# Patient Record
Sex: FEMALE | Race: WHITE | Employment: UNEMPLOYED | URBAN - METROPOLITAN AREA
[De-identification: names, ages, dates, MRNs, and addresses within clinical notes are randomized per-mention and may not be internally consistent; named-entity substitution may affect disease eponyms.]

---

## 2020-01-06 PROBLEM — E87.1 HYPONATREMIA: Status: ACTIVE | Noted: 2020-01-06

## 2020-01-06 PROBLEM — E87.20 METABOLIC ACIDOSIS: Status: ACTIVE | Noted: 2020-01-06

## 2020-01-06 PROBLEM — R11.2 INTRACTABLE NAUSEA AND VOMITING: Status: ACTIVE | Noted: 2020-01-06

## 2020-01-06 PROBLEM — R55 SYNCOPE: Status: ACTIVE | Noted: 2020-01-06

## 2020-01-06 PROBLEM — E87.8 HYPOCHLOREMIA: Status: ACTIVE | Noted: 2020-01-06

## 2020-01-07 PROBLEM — F12.10 MARIJUANA ABUSE: Status: ACTIVE | Noted: 2020-01-07

## 2022-02-10 ENCOUNTER — OFFICE VISIT (OUTPATIENT)
Dept: FAMILY MEDICINE CLINIC | Age: 32
End: 2022-02-10
Payer: COMMERCIAL

## 2022-02-10 VITALS
OXYGEN SATURATION: 99 % | BODY MASS INDEX: 26.52 KG/M2 | HEART RATE: 72 BPM | WEIGHT: 145 LBS | DIASTOLIC BLOOD PRESSURE: 76 MMHG | SYSTOLIC BLOOD PRESSURE: 124 MMHG | TEMPERATURE: 98 F | RESPIRATION RATE: 14 BRPM

## 2022-02-10 DIAGNOSIS — F10.10 ALCOHOL ABUSE: ICD-10-CM

## 2022-02-10 DIAGNOSIS — F32.0 CURRENT MILD EPISODE OF MAJOR DEPRESSIVE DISORDER WITHOUT PRIOR EPISODE (HCC): Primary | ICD-10-CM

## 2022-02-10 PROCEDURE — 99203 OFFICE O/P NEW LOW 30 MIN: CPT | Performed by: FAMILY MEDICINE

## 2022-02-10 RX ORDER — SERTRALINE HYDROCHLORIDE 25 MG/1
TABLET, FILM COATED ORAL
COMMUNITY
Start: 2022-01-20 | End: 2022-10-08

## 2022-02-10 RX ORDER — SERTRALINE HYDROCHLORIDE 25 MG/1
25 TABLET, FILM COATED ORAL DAILY
Qty: 30 TABLET | Refills: 2 | Status: SHIPPED | OUTPATIENT
Start: 2022-02-10 | End: 2022-10-08

## 2022-02-10 RX ORDER — TRAZODONE HYDROCHLORIDE 50 MG/1
TABLET ORAL
COMMUNITY
Start: 2022-01-20 | End: 2022-02-10 | Stop reason: SDUPTHER

## 2022-02-10 RX ORDER — NORETHINDRONE ACETATE AND ETHINYL ESTRADIOL 1; .02 MG/1; MG/1
1 TABLET ORAL DAILY
COMMUNITY

## 2022-02-10 RX ORDER — ERGOCALCIFEROL 1.25 MG/1
CAPSULE ORAL
COMMUNITY
Start: 2022-01-20 | End: 2022-10-08

## 2022-02-10 RX ORDER — TRAZODONE HYDROCHLORIDE 50 MG/1
TABLET ORAL
Qty: 30 TABLET | Refills: 2 | Status: SHIPPED | OUTPATIENT
Start: 2022-02-10 | End: 2022-10-08

## 2022-02-10 NOTE — PROGRESS NOTES
New patient here to establish care. She will need refills sent in on her medications. 1. \"Have you been to the ER, urgent care clinic since your last visit? Hospitalized since your last visit? \" [address] 1/10/22 alcohol withdrawal    2. \"Have you seen or consulted any other health care providers outside of the [address] [place] [address] since your last visit? \" No     3. For patients aged 39-70: Has the patient had a colonoscopy / FIT/ Cologuard? NA - based on age      If the patient is female:    4. For patients aged 41-77: Has the patient had a mammogram within the past 2 years? NA - based on age or sex      11. For patients aged 21-65: Has the patient had a pap smear?  NA - based on age or sex

## 2022-02-10 NOTE — PROGRESS NOTES
Quiana Ramsey is a 27 y.o. female  presents for establish care. She has depression and low vitamin d. No Known Allergies  Outpatient Medications Marked as Taking for the 2/10/22 encounter (Office Visit) with Jerrell Mendoza MD   Medication Sig Dispense Refill    ergocalciferol (ERGOCALCIFEROL) 1,250 mcg (50,000 unit) capsule       sertraline (ZOLOFT) 25 mg tablet TAKE 1 TABLET BY MOUTH EVERY DAY FOR DEPRESSION      traZODone (DESYREL) 50 mg tablet TAKE 1 TABLET BY MOUTH AT BEDTIME AS NEEDED FOR INSOMNIA      norethindrone-ethinyl estradiol (Aurovela 1/20, 21,) 1-20 mg-mcg tablet Take 1 Tablet by mouth daily. Patient Active Problem List   Diagnosis Code    Hypochloremia E87.8    Hyponatremia E87.1    Syncope A04    Metabolic acidosis M19.3    Intractable nausea and vomiting R11.2    Hypokalemia E87.6    Tobacco abuse Z72.0    Pregnancy Z34.90    Marijuana abuse F12.10     History reviewed. No pertinent past medical history. Social History     Socioeconomic History    Marital status: SINGLE   Tobacco Use    Smoking status: Current Every Day Smoker     Packs/day: 0.25     Years: 14.00     Pack years: 3.50    Smokeless tobacco: Never Used   Vaping Use    Vaping Use: Never used   Substance and Sexual Activity    Alcohol use: Not Currently    Drug use: Not Currently     Types: Marijuana    Sexual activity: Yes     Partners: Male     Social Determinants of Health     Physical Activity: Insufficiently Active    Days of Exercise per Week: 1 day    Minutes of Exercise per Session: 10 min     History reviewed. No pertinent family history. Review of Systems   Constitutional: Negative for chills, fever, malaise/fatigue and weight loss. Eyes: Negative for blurred vision. Respiratory: Negative for cough, shortness of breath and wheezing. Cardiovascular: Negative for chest pain. Gastrointestinal: Negative for nausea and vomiting. Musculoskeletal: Negative for myalgias.    Skin: Negative for rash. Neurological: Negative for weakness. Vitals:    02/10/22 1332   BP: 124/76   Pulse: 72   Resp: 14   Temp: 98 °F (36.7 °C)   TempSrc: Tympanic   SpO2: 99%   Weight: 145 lb (65.8 kg)   PainSc:   0 - No pain   LMP: 02/06/2022       Physical Exam  Vitals and nursing note reviewed. Neck:      Thyroid: No thyromegaly. Cardiovascular:      Rate and Rhythm: Normal rate and regular rhythm. Pulses: Normal pulses. Heart sounds: Normal heart sounds. Pulmonary:      Effort: Pulmonary effort is normal.      Breath sounds: Normal breath sounds. Musculoskeletal:         General: Normal range of motion. Cervical back: Normal range of motion and neck supple. Skin:     General: Skin is warm and dry. Neurological:      General: No focal deficit present. Mental Status: She is alert and oriented to person, place, and time. Psychiatric:         Mood and Affect: Mood normal.         Behavior: Behavior normal.         Thought Content: Thought content normal.         Judgment: Judgment normal.         Assessment/Plan      ICD-10-CM ICD-9-CM    1. Current mild episode of major depressive disorder without prior episode ([facility])  F32.0 296.21 sertraline (ZOLOFT) 25 mg tablet      traZODone (DESYREL) 50 mg tablet   2. Alcohol abuse  F10.10 305.00      I have discussed the diagnosis with the patient and the intended plan of care as seen in the above orders. The patient has received an after-visit summary and questions were answered concerning future plans. I have discussed medication, side effects, and warnings with the patient in detail. The patient verbalized understanding and is in agreement with the plan of care. The patient will contact the office with any additional concerns.         lab results and schedule of future lab studies reviewed with patient    Marija Shirley MD

## 2022-02-10 NOTE — PATIENT INSTRUCTIONS
Learning About Depression Screening  What is depression screening? Depression screening is a way to see if you have depression symptoms. It may be done by a doctor or counselor. It's often part of a routine checkup. That's because your mental health is just as important as your physical health. Depression is a medical illness that affects how you feel, think, and act. You may:  · Have less energy. · Lose interest in your daily activities. · Feel sad and grouchy for a long time. Depression is very common. It affects men and women of all ages. Many things can trigger depression. Some people become depressed after they have a stroke or find out they have a major illness like cancer or heart disease. The death of a loved one, a breakup, or changes in the natural brain chemicals may lead to depression. It can run in families. Most experts believe that a combination of inherited genes and stressful life events can cause it. What happens during screening? You may be asked to fill out a form about your depression symptoms. You and the doctor will discuss your answers. The doctor may ask you more questions to learn more about how you think, act, and feel. What happens after screening? If you have signs of depression, your doctor will talk to you about your options. Doctors usually treat depression with medicines or counseling. Often, combining the two works best. Many people don't get help because they think that they'll get over the depression on their own. But people with depression may not get better unless they get treatment. Many people feel embarrassed or ashamed about having depression. But it isn't a sign of personal weakness. It's not a character flaw. A person who is depressed is not \"crazy. \" Depression is caused by changes in the brain. A serious symptom of depression is thinking about death or suicide.  If you or someone you care about talks about this or about feeling hopeless, get help right away.  It's important to know that depression can be treated. The first step toward feeling better is often just seeing that the problem exists. Where can you learn more? Go to http://www.gray.com/  Enter T185 in the search box to learn more about \"Learning About Depression Screening. \"  Current as of: June 16, 2021               Content Version: 13.0  © 0904-3740 [employer]. Care instructions adapted under license by [employer] (which disclaims liability or warranty for this information). If you have questions about a medical condition or this instruction, always ask your healthcare professional. [place] [address] any warranty or liability for your use of this information.

## 2022-03-18 PROBLEM — R55 SYNCOPE: Status: ACTIVE | Noted: 2020-01-06

## 2022-03-18 PROBLEM — E87.8 HYPOCHLOREMIA: Status: ACTIVE | Noted: 2020-01-06

## 2022-03-18 PROBLEM — E87.20 METABOLIC ACIDOSIS: Status: ACTIVE | Noted: 2020-01-06

## 2022-03-19 PROBLEM — E87.1 HYPONATREMIA: Status: ACTIVE | Noted: 2020-01-06

## 2022-03-19 PROBLEM — R11.2 INTRACTABLE NAUSEA AND VOMITING: Status: ACTIVE | Noted: 2020-01-06

## 2022-03-19 PROBLEM — F12.10 MARIJUANA ABUSE: Status: ACTIVE | Noted: 2020-01-07

## 2022-10-08 PROBLEM — H72.91 PERFORATION OF RIGHT TYMPANIC MEMBRANE: Status: ACTIVE | Noted: 2022-10-08

## 2022-10-08 PROBLEM — I10 ELEVATED BLOOD PRESSURE READING IN OFFICE WITH DIAGNOSIS OF HYPERTENSION: Status: ACTIVE | Noted: 2022-10-08

## 2023-02-01 RX ORDER — MOMETASONE FUROATE 50 UG/1
2 SPRAY, METERED NASAL DAILY
COMMUNITY

## 2023-02-01 RX ORDER — NORETHINDRONE ACETATE AND ETHINYL ESTRADIOL 1; .02 MG/1; MG/1
1 TABLET ORAL DAILY
COMMUNITY

## 2023-02-01 RX ORDER — AMOXICILLIN 500 MG/1
500 CAPSULE ORAL 3 TIMES DAILY
COMMUNITY